# Patient Record
(demographics unavailable — no encounter records)

---

## 2024-12-11 NOTE — ASSESSMENT
[FreeTextEntry1] : At this time I discussed PIP sprain versus possible tiny avulsion off the base middle phalanx.  Regardless we discussed that the treatment would be salena taping.  Recommended ice, rest from sports and gym class, gentle range of motion.  Follow-up in a few weeks for repeat evaluation in our hand department

## 2024-12-11 NOTE — IMAGING
[de-identified] : Examination of the right ring finger swelling is noted, skin is intact.  Nails intact.  Tenderness is noted over the middle phalanx and PIP joint.  No tenderness over the distal phalanx.  No tenderness over the proximal phalanx. No tenderness over the third MCP.  Patient is able to flex and extend at the MCP, PIP and DIP joint although with restriction and pain.  X-ray reviewed on disc from [facility] questionable subtle lucency consistent with fracture base middle phalanx, suboptimal lateral view Repeat x-ray in the office today of the right finger 3 views no obvious displaced fracture or dislocation

## 2025-01-02 NOTE — IMAGING
[de-identified] : Examination of the right ring finger swelling is noted, skin is intact.  Nails intact.  Mild tenderness is noted over the middle phalanx and PIP joint.  No tenderness over the distal phalanx.  No tenderness over the proximal phalanx. No tenderness over the third MCP.  Patient is able to flex and extend at the MCP, PIP and DIP joint although with restriction and pain.  X-ray reviewed on disc from [facility] questionable subtle lucency consistent with fracture base middle phalanx, suboptimal lateral view Repeat x-ray in the office today of the right finger 3 views no obvious displaced fracture or dislocation

## 2025-01-02 NOTE — HISTORY OF PRESENT ILLNESS
[de-identified] : 11-year-old female comes in today for an evaluation of her right ring finger pain and injury that occurred on December 10.  Patient was doing rhythmic gymnastics and the " club" hit the ring finger.  [facility] diagnosed with possible fracture.  Accompanied by mom today.

## 2025-06-18 NOTE — DISCUSSION/SUMMARY
[de-identified] : Chief complaint: Right wrist pain  HPI: Patient is an 11-year-old right-hand-dominant female presents the office today accompanied by her mother for the evaluation of right wrist pain for the past 2 days without any known fall, injury, or trauma.  Pain is localized over the radial aspect of the wrist which is accompanied by intermittent popping sensation with certain range of motion.  No reported previous fracture or dislocation of the right wrist.  No reported new activity.  ROS: Positive for right wrist pain  Physical examination the right wrist:  No appreciable edema No erythema No ecchymosis Skin is intact Patient has good range of motion to the wrist in flexion, extension, ulnar deviation, radial deviation Patient is able to make a full fist There is tenderness to palpation over the radial aspect of the wrist and over the radial styloid No appreciable tenderness over the ulnar, volar, or dorsal wrist No tenderness to palpation over the diffuse hand Negative anatomic snuffbox tenderness Positive Finkelstein's Distal sensation grossly intact light touch Capillary refill less than 2 seconds  Three-view x-rays of the right wrist performed in the office today show no obvious acute displaced fracture, subluxation, or dislocation  Assessment/plan: Tendinitis of the right wrist, discussed treatment with the patient and with her mother  1.  Today the patient will be placed in a right thumb spica brace which I recommend she wear while active for comfort and support, this brace can be removed for resting, sleeping, and showering/hygiene 2.  Recommend elevation of the right upper extremity at or above heart level when resting, ice or heat can be applied to the right wrist on an as-needed basis with sensory precautions 3.  Patient can be given over-the-counter weight-based children's Motrin or Tylenol on an as-needed basis for discomfort 4.  Discussed activity modifications, would avoid activity that exacerbates the patient's current pain  Patient will be provided with a 3-week follow-up with our hand and wrist department for repeat evaluation, patient and her mother verbalized understanding of all findings in the office today, they agree to follow-up as directed

## 2025-06-18 NOTE — DISCUSSION/SUMMARY
[de-identified] : Chief complaint: Right wrist pain  HPI: Patient is an 11-year-old right-hand-dominant female presents the office today accompanied by her mother for the evaluation of right wrist pain for the past 2 days without any known fall, injury, or trauma.  Pain is localized over the radial aspect of the wrist which is accompanied by intermittent popping sensation with certain range of motion.  No reported previous fracture or dislocation of the right wrist.  No reported new activity.  ROS: Positive for right wrist pain  Physical examination the right wrist:  No appreciable edema No erythema No ecchymosis Skin is intact Patient has good range of motion to the wrist in flexion, extension, ulnar deviation, radial deviation Patient is able to make a full fist There is tenderness to palpation over the radial aspect of the wrist and over the radial styloid No appreciable tenderness over the ulnar, volar, or dorsal wrist No tenderness to palpation over the diffuse hand Negative anatomic snuffbox tenderness Positive Finkelstein's Distal sensation grossly intact light touch Capillary refill less than 2 seconds  Three-view x-rays of the right wrist performed in the office today show no obvious acute displaced fracture, subluxation, or dislocation  Assessment/plan: Tendinitis of the right wrist, discussed treatment with the patient and with her mother  1.  Today the patient will be placed in a right thumb spica brace which I recommend she wear while active for comfort and support, this brace can be removed for resting, sleeping, and showering/hygiene 2.  Recommend elevation of the right upper extremity at or above heart level when resting, ice or heat can be applied to the right wrist on an as-needed basis with sensory precautions 3.  Patient can be given over-the-counter weight-based children's Motrin or Tylenol on an as-needed basis for discomfort 4.  Discussed activity modifications, would avoid activity that exacerbates the patient's current pain  Patient will be provided with a 3-week follow-up with our hand and wrist department for repeat evaluation, patient and her mother verbalized understanding of all findings in the office today, they agree to follow-up as directed